# Patient Record
Sex: MALE | Race: WHITE | NOT HISPANIC OR LATINO | Employment: FULL TIME | URBAN - METROPOLITAN AREA
[De-identification: names, ages, dates, MRNs, and addresses within clinical notes are randomized per-mention and may not be internally consistent; named-entity substitution may affect disease eponyms.]

---

## 2022-11-24 ENCOUNTER — HOSPITAL ENCOUNTER (EMERGENCY)
Facility: CLINIC | Age: 47
Discharge: HOME | End: 2022-11-24
Attending: EMERGENCY MEDICINE | Admitting: EMERGENCY MEDICINE
Payer: COMMERCIAL

## 2022-11-24 VITALS
BODY MASS INDEX: 22.9 KG/M2 | RESPIRATION RATE: 17 BRPM | OXYGEN SATURATION: 100 % | WEIGHT: 160 LBS | SYSTOLIC BLOOD PRESSURE: 106 MMHG | DIASTOLIC BLOOD PRESSURE: 65 MMHG | HEART RATE: 88 BPM | HEIGHT: 70 IN | TEMPERATURE: 97.3 F

## 2022-11-24 DIAGNOSIS — S61.011A LACERATION OF RIGHT THUMB WITHOUT FOREIGN BODY WITHOUT DAMAGE TO NAIL, INITIAL ENCOUNTER: ICD-10-CM

## 2022-11-24 PROCEDURE — 250N000011 HC RX IP 250 OP 636: Performed by: EMERGENCY MEDICINE

## 2022-11-24 PROCEDURE — 12001 RPR S/N/AX/GEN/TRNK 2.5CM/<: CPT | Performed by: EMERGENCY MEDICINE

## 2022-11-24 PROCEDURE — 90471 IMMUNIZATION ADMIN: CPT | Performed by: EMERGENCY MEDICINE

## 2022-11-24 PROCEDURE — 90715 TDAP VACCINE 7 YRS/> IM: CPT | Performed by: EMERGENCY MEDICINE

## 2022-11-24 PROCEDURE — 99283 EMERGENCY DEPT VISIT LOW MDM: CPT | Mod: 25 | Performed by: EMERGENCY MEDICINE

## 2022-11-24 PROCEDURE — 99282 EMERGENCY DEPT VISIT SF MDM: CPT | Mod: 25 | Performed by: EMERGENCY MEDICINE

## 2022-11-24 RX ORDER — LIDOCAINE HYDROCHLORIDE 10 MG/ML
10 INJECTION, SOLUTION EPIDURAL; INFILTRATION; INTRACAUDAL; PERINEURAL ONCE
Status: DISCONTINUED | OUTPATIENT
Start: 2022-11-24 | End: 2022-11-24 | Stop reason: HOSPADM

## 2022-11-24 RX ADMIN — CLOSTRIDIUM TETANI TOXOID ANTIGEN (FORMALDEHYDE INACTIVATED), CORYNEBACTERIUM DIPHTHERIAE TOXOID ANTIGEN (FORMALDEHYDE INACTIVATED), BORDETELLA PERTUSSIS TOXOID ANTIGEN (GLUTARALDEHYDE INACTIVATED), BORDETELLA PERTUSSIS FILAMENTOUS HEMAGGLUTININ ANTIGEN (FORMALDEHYDE INACTIVATED), BORDETELLA PERTUSSIS PERTACTIN ANTIGEN, AND BORDETELLA PERTUSSIS FIMBRIAE 2/3 ANTIGEN 0.5 ML: 5; 2; 2.5; 5; 3; 5 INJECTION, SUSPENSION INTRAMUSCULAR at 20:32

## 2022-11-24 ASSESSMENT — ENCOUNTER SYMPTOMS: WOUND: 1

## 2022-11-25 NOTE — ED PROVIDER NOTES
"    [facility] ([facility])  11/24/22    History     Chief Complaint   Patient presents with     Laceration     HPI  Álvaro Couch is a 47 year old male who presents to the ED for evaluation of a right thumb laceration.  Patient reports he was slicing potatoes earlier today using a  and accidentally cut his right thumb.  He states this happened just over an hour prior to arrival, approximately 5 PM.  He states that he continued bleeding since the laceration occurred.  He denies being anticoagulated.  He denies any chronic medical problems or daily medications.  Patient reports he is right-handed.  Patient is unsure when his last tetanus was and does not think it was in the past 10 years.    Past Medical History  No past medical history on file.  No past surgical history on file.  No current outpatient medications on file.    No Known Allergies  Family History  No family history on file.  Social History       Past medical history, past surgical history, medications, allergies, family history, and social history were reviewed with the patient. No additional pertinent items.       Review of Systems   Skin: Positive for wound (3.5 cm R thumb laceration).   All other systems reviewed and are negative.    A complete review of systems was performed with pertinent positives and negatives noted in the HPI, and all other systems negative.    Physical Exam   BP: (!) 147/87  Pulse: 89  Temp: 97.3  F (36.3  C)  Resp: 18  Height: 177.8 cm (5' 10\")  Weight: 72.6 kg (160 lb)  SpO2: 98 %  Physical Exam  Skin:     Comments: Right distal thumb with a 3 cm x 1 cm skin avulsion to the lateral aspect of the nail distal to the DIP joint.  There is mild oozing of blood from the superficial skin areas.  Normal movement of the thumb.  Normal distal sensation.     Physical Exam   Constitutional: oriented to person, place, and time. appears well-developed and well-nourished.   HENT:   Head: " Normocephalic and atraumatic.   Neck: Normal range of motion.   Pulmonary/Chest: Effort normal. No respiratory distress.   Neurological: alert and oriented to person, place, and time.   Skin: Skin is warm and dry.   Psychiatric:  normal mood and affect.  behavior is normal. Thought content normal.       ED Course      Procedures   7:17 PM  The patient was seen and examined by Kaylyn Peck MD in Room [facility].        The medical record was reviewed and interpreted.         [facility] Procedure Note        Laceration Repair:    Performed by: Kaylyn Peck MD  Authorized by: Kaylyn Peck MD  Consent given by: Patient who states understanding of the procedure being performed after discussing the risks, benefits and alternatives.    Preparation: Patient was prepped and draped in usual sterile fashion.  Irrigation solution: saline    Body area:3.5 finger  Laceration length: 5cm  Contamination: The wound is not contaminated.  Foreign bodies:none  Tendon involvement: none  Anesthesia: Local  Local anesthetic: Lidocaine     1%  Anesthetic total: 4ml    Debridement: none  Skin closure: Closed with 5 x 4.0 Prolene  Technique: interrupted  Approximation: close  Approximation difficulty: simple    Patient tolerance: Patient tolerated the procedure well with no immediate complications.       No results found for any visits on 11/24/22.  Medications - No data to display     Assessments & Plan (with Medical Decision Making)   Patient is a very nice 47-year-old male who presented to the ER due to a skin avulsion/laceration to the distal aspect of his right thumb.  Patient was still having ongoing bleeding so a tight dressing was applied.  After this was done I used 5 stitches to more approximated the wound since the wound was more of a avulsion.  This helped the edges of the laceration come together which will help quicker healing.  Patient's bleeding has stopped now that we have done a tight  dressing.  Plan will be to discharge him home with outpatient follow-up for suture removal.  Did discuss the plan of care in full details with the patient and his significant other.  Tetanus was updated.    I have reviewed the nursing notes. I have reviewed the findings, diagnosis, plan and need for follow up with the patient.    New Prescriptions    No medications on file       Final diagnoses:   Laceration of right thumb without foreign body without damage to nail, initial encounter     IPeter, am serving as a trained medical scribe to document services personally performed by Kaylyn Peck MD, based on the provider's statements to me.     I, Kaylyn Peck MD, was physically present and have reviewed and verified the accuracy of this note documented by Peter tOt.    --  Kaylyn Peck MD  [facility] EMERGENCY DEPARTMENT  11/24/2022     Kaylyn Peck MD  11/24/22 0803

## 2022-11-25 NOTE — DISCHARGE INSTRUCTIONS
Please make an appointment to follow up with Your Primary Care Provider in 7-10 days for stitches removal.      *LACERATION (All: sutures, staples, tape, glue)  A laceration is a cut through the skin. This will usually require stitches (sutures) or staples if it is deep. Minor cuts may be treated with a tape closure ( Steri-Strips ) or Dermabond skin glue.    HOME CARE:  EXTREMITY, FACE or TRUNK WOUNDS: Keep the wound clean and dry. If a bandage was applied and it becomes wet or dirty, replace it. Otherwise, leave it in place for the first 24 hours.  If stitches or staples were used, clean the wound daily. Protect the wound from sunlight and tanning lamps.  After removing the bandage, wash the area with soap and water. Use a wet cotton swab (Q tip) to loosen and remove any blood or crust that forms.  After cleaning, apply a thin layer of Polysporin or Bacitracin ointment. This will keep the wound clean and make it easier to remove the stitches or staples. Reapply a fresh bandage.  You may remove the bandage to shower as usual after the first 24 hours, but do not soak the area in water (no swimming) until the stitches or staples are removed.  If Steri-Strips were used, keep the area clean and dry. If it becomes wet, blot it dry with a towel. It is okay to take a brief shower, but avoid scrubbing the area.  If Dermabond skin adhesive was used, do not scratch, rub or pick at the adhesive film. Do not place tape directly over the film. Do not apply liquid, ointment or creams to the wound while the film is in place. Do not clean the wound with peroxide and do not apply ointments. Avoid activities that cause heavy sweating until the film has fallen off. Protect the wound from prolonged exposure to sunlight or tanning lamps. You may shower as usual but do not soak the wound in water (no baths or swimming). The film will fall off by itself in 5-10 days.  SCALP WOUNDS: During the first two days, you may carefully rinse  your hair in the shower to remove blood, glass or dirt particles. After two days, you may shower and shampoo your hair normally. Do not soak your scalp in the tub or go swimming until the stitches or staples have been removed.  MOUTH WOUNDS: Eat soft foods to reduce pain. If the cut is inside of your mouth, clean by rinsing after each meal and at bedtime with a mixture of equal parts water and Hydrogen Peroxide (do not swallow!). Or, you can use a cotton swab to directly apply Hydrogen Peroxide onto the cut.  You may use acetaminophen (Tylenol) 650-1000 mg every 6 hours or ibuprofen (Motrin, Advil) 600 mg every 6-8 hours with food to control pain, if you are able to take these medicines. [NOTE: If you have chronic liver or kidney disease or ever had a stomach ulcer or GI bleeding, talk with your doctor before using these medicines.]  Use sunscreen on the area for 6 months after the wound heals to keep the scar from getting darker.   FOLLOW UP: Most skin wounds heal within ten days. Mouth and facial wounds heal within five days. However, even with proper treatment, a wound infection may sometimes occur. Therefore, you should check the wound daily for signs of infection listed below.  Stitches should be removed from the face within five days; stitches and staples should be removed from other parts of the body within 7-10 days. Unless you are told to come back to the emergency room, you may have your doctor or urgent care remove the stitches. If dissolving stitches were used in the mouth, these will fall out or dissolve without the need for removal. If tape closures ( Steri-Strips ) were used, remove them yourself if they have not fallen off after 7 days. If Dermabond skin glue was used, the film will fall off by itself in 5-10 days.   GET PROMPT MEDICAL ATTENTION if any of the following occur:  Increasing pain in the wound  Redness, swelling or pus coming from the wound  Fever over 101 F (38.3 C) oral  If stitches or  staples come apart or fall out or if Steri-Strips fall off before seven days  If the wound edges re-open  Bleeding not controlled by direct pressure    6891-8922 [employer], [address] [place] [address], [place] [address]. All rights reserved. This information is not intended as a substitute for professional medical care. Always follow your healthcare professional's instructions.